# Patient Record
Sex: FEMALE | Employment: OTHER | URBAN - METROPOLITAN AREA
[De-identification: names, ages, dates, MRNs, and addresses within clinical notes are randomized per-mention and may not be internally consistent; named-entity substitution may affect disease eponyms.]

---

## 2021-10-21 ENCOUNTER — HOSPITAL ENCOUNTER (OUTPATIENT)
Dept: PHYSICAL THERAPY | Age: 81
Discharge: HOME | End: 2021-10-21
Payer: MEDICARE

## 2021-10-21 PROCEDURE — 97112 NEUROMUSCULAR REEDUCATION: CPT

## 2021-10-21 PROCEDURE — 97162 PT EVAL MOD COMPLEX 30 MIN: CPT

## 2021-10-21 PROCEDURE — 97110 THERAPEUTIC EXERCISES: CPT

## 2021-10-21 NOTE — PROGRESS NOTES
[address] [place] [address] PHYSICAL THERAPY  [address] [place] [address] Lico Lock, [address] [employer] [address] - Phone: [phone]  Fax: [phone] / 9452 [facility]  Patient Name: Kimmie Hidalgo : 1940   Medical   Diagnosis: Other low back pain [M54.59] Treatment Diagnosis: Cervicalgia with left UE radiculopathy, mechanical LBP, postural dysfunction   Onset Date: Chronic 2020     Referral Source: Mary Ann Cason MD [facility]): 10/21/2021   Prior Hospitalization: See medical history Provider #:  [id number]   Prior Level of Function: Requires supervision assist with amb and standing ADL's due to fear of falling and partial blindness   Comorbidities: Right eye partial blindness-glaucoma, bilateral knee OA   Medications: Verified on Patient Summary List   The Plan of Care and following information is based on the information from the initial evaluation.   ===========================================================================================  Assessment / key information:  Kimmie Hidalgo is a [de-identified] y.o.  female with Dx: Other low back pain [M54.59], signs and symptoms consistent with cervicalgia with left UE radiculopathy, mechanical LBP, postural dysfunction. Pt presents to PT with reports of chronic LBP in the center of the spine achey in nature. The pt reports left LE pain that radiates from the left buttocks into the lateral left lower LE. The pt reports left LE more achey and pulling in nature in comparison to a radicular nature. The pt has significantly decreased vision in the right eye. The pt denies dizziness or any falls. The pt had a cortisone injection in the L/S one month ago which provided no relief. Objective:  The pt demonstrates the following functional deficits: 1) decreased C/S AROM, 2) left UE radiculopathy that centralizes with C/S retractions with left lateral flexion, 3) L/S extension AROM flexibility deficits (5 degrees L/S extension,) 4) severe hip flexor tightness B, 5) significant hip girdle MMT deficits, 6) UE AROM deficits (150 degrees of overhead AROM B,) and 7) postural dysfunction. See below for more details. C/S ROM     Range   Effect  Strength (MMT)          Right        Left    Flex WFL   Upper Trap (C2,3,4) 4 4   Ext 45    Shld IR (C5,6) 4 4   R-lat flex 38   Shld ER (C5) 4 4   L-lat flex 20   Wrist flex (C7) 5 5   R-rot 65   Wrist ext (C6) 5 5   L-rot 48   Deltiod (C5,6) 3 3         Biceps (C5, C6) 4 4         Triceps (C7) 4 4         Thenar Ext (C8) 5 5         Intrinsics (T1) 5 5       (N - normal; R - reduced; MR - markedly reduced)       L/S ROM      Range   Effect  Strength (MMT)          Right        Left    Flex N   Psoas (L2,3) 4- 4-   Ext MR   Quads (L3) 4 4   R-lat flex R   Ant tib(L4) 5 5   L-lat flex R   Hip Ext (S1, S2) 3- 3-   R-rot R   Glut Med(L5) 3- 3-   L-rot R   Hamstrings(S1,S2) 4 4         Hip IR/ER 4/3- 3/3-     The patient's FOTO score of 44 points indicates 56% limitation in functional ability. The patient would benefit from skilled PT to address the below listed impairments.  Thank you for your referral.  ===========================================================================================  Eval Complexity: History: MEDIUM  Complexity : 1-2 comorbidities / personal factors will impact the outcome/ POC Exam:HIGH Complexity : 4+ Standardized tests and measures addressing body structure, function, activity limitation and / or participation in recreation  Presentation: MEDIUM Complexity : Evolving with changing characteristics  Clinical Decision Making:MEDIUM Complexity : FOTO score of 26-74Overall Complexity:MEDIUM    Problem List: pain affecting function, decrease ROM, decrease strength, impaired gait/ balance, decrease ADL/ functional abilitiies, decrease activity tolerance, decrease flexibility/ joint mobility and decrease transfer abilities   Treatment Plan may include any combination of the following: Therapeutic exercise, Therapeutic activities, Neuromuscular re-education, Physical agent/modality, Gait/balance training, Manual therapy, Patient education, Self Care training, Functional mobility training, Home safety training and Stair training    Patient / Family readiness to learn indicated by: asking questions, trying to perform skills and interest  Persons(s) to be included in education: patient (P)  Barriers to Learning/Limitations: None  Measures taken: na   Patient Goal (s): \"I'd like to be able to walk better and walk longer distances. \"   Patient self reported health status: fair  Rehabilitation Potential: good   Short Term Goals: To be accomplished in  2-3  weeks:  1. Patient will demonstrate compliance with HEP for symptom management at home. 2. Patient will demonstrate at least 60 degrees of C/S rotation AROM B to improve safety and efficiency with driving. 3. Patient will demonstrate 165 degrees of shoulder flexion AROM B to decrease C/S strain with overhead ADL's.  Long Term Goals: To be accomplished in  4-6  weeks:  1. Patient will be independent with HEP to self-manage and prevent symptoms upon DC. 2.  Patient will improve FOTO score to at least 55 points to indicate improved functional status. 3.  Patient will demonstrate at least 4/5 hip abduction MMT B to improve pelvic stability with amb. 4.  Patient will perform a 5x sit to  no more than 13 seconds to decrease fall risk with amb.   Frequency / Duration:   Patient to be seen  2-3  times per week for 4-6  weeks:  Patient / Caregiver education and instruction: self care, activity modification and exercises      Therapist Signature: Nancy Sparks DPT Date: 50/98/9490   Certification Period: 102/21/21 - 1/20/21 Time: 1:25 PM   ===========================================================================================  I certify that the above Physical Therapy Services are being furnished while the patient is under my care. I agree with the treatment plan and certify that this therapy is necessary. Physician Signature:        Date:       Time:        Galindo Padilla MD  Please sign and return to [employer] or you may fax the signed copy to [phone]. Thank you.

## 2021-10-21 NOTE — PROGRESS NOTES
PHYSICAL THERAPY - DAILY TREATMENT NOTE    Patient Name: Oneyda Harris        Date: 10/21/2021  : 1940   YES Patient  Verified  Visit #:     Insurance: Payor: Sunil Proffer / Plan: [address] [place] [address] HMO / Product Type: Managed Care Medicare /      In time: 11:50 Out time: 12:50   Total Treatment Time: 60     Medicare/BCBS Time Tracking (below)   Total Timed Codes (min):  40 1:1 Treatment Time:  40     TREATMENT AREA =  L/S    SUBJECTIVE    Pain Level (on 0 to 10 scale):  5  / 10   Medication Changes/New allergies or changes in medical history, any new surgeries or procedures? NO     If yes, update Summary List   Subjective Functional Status/Changes:  []  No changes reported     History of Condition: Pt presents to PT with reports of chronic LBP in the center of the spine achey in nature. The pt reports left LE pain that radiates from the left buttocks into the lateral left lower LE. The pt reports left LE more achey and pulling in nature in comparison to a radicular nature. The pt has significantly decreased vision in the right eye. The pt denies dizziness or any falls. The pt had a cortisone injection in the L/S one month ago which provided no relief. Aggravating Factors: Alleviating Factors:     Previous Treatment:     PMHx:see chart    Social/Recreational/Work:pt sleep on both sides, not in fetal position    Pt Goals: \"I'd like to be able to walk better and walk longer distances. \"    FOTO: 44     CERVICAL EVALUATION    Objective:      Palpation/Sensation:    UE gross AROM: 155 degrees bilateral    (N - normal; R - reduced; MR - markedly reduced)        C/S ROM     Range   Effect  Strength (MMT)          Right        Left    Flex WFL  Upper Trap (C2,3,4) 4 4   Ext 45  Shld IR (C5,6) 4 4   R-lat flex 38  Shld ER (C5) 4 4   L-lat flex 20  Wrist flex (C7) 5 5   R-rot 65  Wrist ext (C6) 5 5   L-rot 48  Deltiod (C5,6) 3 3      Biceps (C5, C6) 4 4      Triceps (C7) 4 4 Thenar Ext (C8) 5 5      Intrinsics (T1) 5 5     Strength              Right     Left        Special Tests            Right     Left   @ setting #3   Cervical Compression       @ setting#3   Cervical Traction      @ setting#3   Spurlings        Alar ligament        Sharp-Mindi       LOW BACK EVALUATION    Objective:      Gait    Posture:    Palpation/Sensation:    (N - normal; R - reduced; MR - markedly reduced)       L/S ROM      Range   Effect  Strength (MMT)          Right        Left    Flex N  Psoas (L2,3) 4- 4-   Ext MR  Quads (L3) 4 4   R-lat flex R  Ant tib(L4) 5 5   L-lat flex R  Hip Ext (S1, S2) 3- 3-   R-rot R  Glut Med(L5) 3- 3-   L-rot R  Hamstrings(S1,S2) 4 4      Hip IR/ER 4/3- 3/3-     Strength (MMT)       Right     Left          Core: Sup Bridge     Core: Side Bridge     Core: Prone plank       Special Tests                       Right     Left          Flexibility         Right              Left    Slump   90/90 HS     SLR   Estephanie Prophet     Sl screen 3/5=70% LR   Lazarus     Gaenslen test   Hip IR (prone)     Lux/ABDULAZIZ sign   Hip ER (prone)     Thigh thrust        Distraction        Lateral Compression        Sacral Provocation          Effect of:  DKC    Supine Bridge    SL Supine Bridge    Prone on Elbows    RFIS    REIL          SI Symmetry:    Standing        Supine            Prone                Dynamic      +/- R/L  ASIS    Fwd Flex    IC    Stork    PSIS    Seated FF      Sup to Sit:     Modalities Rationale:   To improve activity tolerance for strengthening exercises and ADL's.   min [] Estim, type/location:                                      []  att     []  unatt     []  w/US     []  w/ice    []  w/heat    min []  Mechanical Traction: type/lbs                   []  pro   []  sup   []  int   []  cont    []  before manual    []  after manual    min []  Ultrasound, settings/location:      min []  Iontophoresis w/ dexamethasone, location: []  take home patch       []  in clinic    min []  Ice     []  Heat    location/position:     min []  Vasopneumatic Device, press/temp:     min []  Other:    [x] Skin assessment post-treatment (if applicable):   [x]  intact    []  redness- no adverse reaction     []redness - adverse reaction:      25 min Therapeutic Exercise:  [x]  See flow sheet   Rationale:      increase ROM and increase strength to improve the patients ability to perform ADL's with improved hip girdle flexibility and stability. .     15 min Neuromuscular Re-ed: Repeated lumbar extension, repeated C/S retractions with left lateral flexion   Rationale: To centralize radicular symptoms and improve carryover with ADL's.     min Patient Education:  YES  Reviewed HEP   []  Progressed/Changed HEP based on:   HEP issued     Other Objective/Functional Measures:    See above     Post Treatment Pain Level (on 0 to 10) scale:   0  / 10     ASSESSMENT    Assessment/Changes in Function:     Justification for Eval Code Complexity: MODERATE  Patient History (low 0, mod 1-2, high 3-4):glaucoma and partial blindness right eye, bilateral knee OA MODERATE    Examination (low 1-2, mod 3+, high 4+): LE AROM, LE MMT, L/S AROM, UE AROM, UE MMT, C/S AROM, C/S and L/S repeated motions HIGH   Clinical Presentation (low: stable/uncomplicated; mod: evolving; high: unstable/unpredictable): evolving symptoms with exercise MODERATE  Clinical Decision Making (low , mod 26-74, high 1-25): 44 MODERATE     []  See Progress Note/Recertification   Patient will continue to benefit from skilled PT services to modify and progress therapeutic interventions, address functional mobility deficits, address ROM deficits, address strength deficits, analyze and address soft tissue restrictions, analyze and cue movement patterns, analyze and modify body mechanics/ergonomics and assess and modify postural abnormalities to attain remaining goals.    Progress toward goals / Updated goals:    Goals established, see PoC     PLAN    [x]  Upgrade activities as tolerated YES Continue plan of care   []  Discharge due to :    [x]  Other: Initiate PoC     Therapist: Wenceslao Cardona    Date: 10/21/2021 Time: 11:58 AM

## 2021-10-26 ENCOUNTER — HOSPITAL ENCOUNTER (OUTPATIENT)
Dept: PHYSICAL THERAPY | Age: 81
Discharge: HOME | End: 2021-10-26
Payer: MEDICARE

## 2021-10-26 PROCEDURE — 97112 NEUROMUSCULAR REEDUCATION: CPT

## 2021-10-26 PROCEDURE — 97110 THERAPEUTIC EXERCISES: CPT

## 2021-10-26 NOTE — PROGRESS NOTES
PHYSICAL THERAPY - DAILY TREATMENT NOTE    Patient Name: Rigo Chen        Date: 10/26/2021  : 1940   YES Patient  Verified  Visit #:   2     Insurance: Payor: Sun Parnell / Plan: [address] [place] [address] HMO / Product Type: Managed Care Medicare /      In time: 10:12 Out time: 11:05   Total Treatment Time: 53     Medicare/BCBS Time Tracking (below)   Total Timed Codes (min):  53 1:1 Treatment Time:  53     TREATMENT AREA =  Other low back pain [M54.59]    SUBJECTIVE    Pain Level (on 0 to 10 scale):  7  / 10   Medication Changes/New allergies or changes in medical history, any new surgeries or procedures? NO     If yes, update Summary List   Subjective Functional Status/Changes:  []  No changes reported     \"I've been feeling good, but something happened yesterday and my back started really hurting. \"          OBJECTIVE    Modalities Rationale: To improve activity tolerance for exercise performance and ADL's.    min [] Estim, type/location:                                      []  att     []  unatt     []  w/US     []  w/ice    []  w/heat    min []  Mechanical Traction: type/lbs                   []  pro   []  sup   []  int   []  cont    []  before manual    []  after manual    min []  Ultrasound, settings/location:      min []  Iontophoresis w/ dexamethasone, location:                                               []  take home patch       []  in clinic   NA min []  Ice     []  Heat    location/position:     min []  Vasopneumatic Device, press/temp:     min []  Other:    [x] Skin assessment post-treatment (if applicable):   [x]  intact    []  redness- no adverse reaction     []redness - adverse reaction:    40 min Therapeutic Exercise:  [x]  See flow sheet   Rationale:      increase ROM and increase strength to improve the patients ability to perform ADL's with improved core stability and hip girdle and periscapular flexibility.        13 min Neuromuscular Reeducation Repeated C/S retractions, repeated L/S extension in standing. Rationale: To cetnralize radicular symptoms and improve efficiency dressing ADL's.   min Patient Education:  YES  Reviewed HEP   []  Progressed/Changed HEP based on:   Patient reports compliance     Other Objective/Functional Measures:    Pt reports no change in symptoms with standing lumbar extension. Pt reports symptoms abolish upon lying down. Performed SKTC in supine. Once pt stood and walked for 5 minutes, LBP did not return. Pt has no pain with lucille, keeping SKTC component. Pt requires cues with open book to perform with T/S rotation vs shoulder horizontal hyperabduction. See flowsheet for more details. Post Treatment Pain Level (on 0 to 10) scale:   0  / 10     ASSESSMENT    Assessment/Changes in Function:     WIll continue to progress strengthening/AROM per activity tolerance. []  See Progress Note/Recertification   Patient will continue to benefit from skilled PT services to modify and progress therapeutic interventions, address functional mobility deficits, address ROM deficits, address strength deficits, analyze and address soft tissue restrictions, analyze and cue movement patterns, analyze and modify body mechanics/ergonomics and assess and modify postural abnormalities to attain remaining goals. Progress toward goals / Updated goals: · Short Term Goals: To be accomplished in  2-3  weeks:  1. Patient will demonstrate compliance with HEP for symptom management at home. 2. Patient will demonstrate at least 60 degrees of C/S rotation AROM B to improve safety and efficiency with driving. Addressed with C/S retractions with lateral flexion. 3. Patient will demonstrate 165 degrees of shoulder flexion AROM B to decrease C/S strain with overhead ADL's. Addressed with open book. · Long Term Goals: To be accomplished in  4-6  weeks:  1. Patient will be independent with HEP to self-manage and prevent symptoms upon DC.   2.  Patient will improve FOTO score to at least 55 points to indicate improved functional status. 3.  Patient will demonstrate at least 4/5 hip abduction MMT B to improve pelvic stability with amb. Addressed with clams. 4.  Patient will perform a 5x sit to  no more than 13 seconds to decrease fall risk with amb.      PLAN    [x]  Upgrade activities as tolerated YES Continue plan of care   []  Discharge due to :    []  Other:      Therapist: Gisela Willis    Date: 10/26/2021 Time: 10:26 AM     Future Appointments   Date Time Provider Ying Koch   11/2/2021 11:45 AM Priya Constable [facility]   11/3/2021 11:00 AM Priya Constable [facility]   11/9/2021  1:15 PM Kirk Bonnerpper [facility]   11/11/2021 11:00 AM Kailey Rajput PT [facility]   11/16/2021 11:00 AM Kirk Bonnerpper [facility]   11/18/2021 11:00 AM Kailey Rajput PT [facility]   11/22/2021 11:00 AM Kirk Bonnerpper [facility]   11/24/2021  1:15 PM Kailey Rajput PT [facility]   11/30/2021 11:00 AM Priya Constable [facility]

## 2021-10-28 ENCOUNTER — APPOINTMENT (OUTPATIENT)
Dept: PHYSICAL THERAPY | Age: 81
End: 2021-10-28
Payer: MEDICARE

## 2021-11-01 ENCOUNTER — APPOINTMENT (OUTPATIENT)
Dept: PHYSICAL THERAPY | Age: 81
End: 2021-11-01
Payer: MEDICARE

## 2021-11-02 ENCOUNTER — APPOINTMENT (OUTPATIENT)
Dept: PHYSICAL THERAPY | Age: 81
End: 2021-11-02
Payer: MEDICARE

## 2021-11-03 ENCOUNTER — HOSPITAL ENCOUNTER (OUTPATIENT)
Dept: PHYSICAL THERAPY | Age: 81
Discharge: HOME | End: 2021-11-03
Payer: MEDICARE

## 2021-11-03 PROCEDURE — 97110 THERAPEUTIC EXERCISES: CPT

## 2021-11-03 NOTE — PROGRESS NOTES
PHYSICAL THERAPY - DAILY TREATMENT NOTE11:55    Patient Name: Janee Hargrove        Date: 11/3/2021  : 1940   YES Patient  Verified  Visit #:   3     Insurance: Payor: Jacqueline Marvin / Plan: [address] [place] [address] HMO / Product Type: Managed Care Medicare /      In time: 1100 Out time: 0196   Total Treatment Time: 53     Medicare/BCBS [place] Time Tracking (below)   Total Timed Codes (min):  43 1:1 Treatment Time:  43     TREATMENT AREA =  Other low back pain [M54.59]    SUBJECTIVE    Pain Level (on 0 to 10 scale):  4  / 10   Medication Changes/New allergies or changes in medical history, any new surgeries or procedures? NO    If yes, update Summary List   Subjective Functional Status/Changes:  []  No changes reported     Patient went out of town this past weekend to visit grandchildren. Her family has a 70lb dog who jumped on the couch while she was sitting on it. She describes quickly reaching for her granddaughter which irritated LBP. OBJECTIVE    43 min Therapeutic Exercise:  [x]  See flow sheet   Rationale:      increase ROM and increase strength to improve the patients ability to perform ADL's with greater ease. Modalities Rationale:    decrease pain and increase tissue extensibility to improve patient's ability to complete functional tasks with less pain. 10 min []  Ice     [x]  Heat    location/position: Lower back, Post tx supine with wedge   [x] Skin assessment post-treatment (if applicable):    [x]  intact    [x]  redness- no adverse reaction     []redness - adverse reaction:       min Patient Education:  YES  Reviewed HEP   []  Progressed/Changed HEP based on:   Cont with HEP     Other Objective/Functional Measures:    Cont with outlined TE program. Due to ms soreness, only progressed with one new exercise. VC required for mechanics.       Post Treatment Pain Level (on 0 to 10) scale:   0  / 10     ASSESSMENT    Assessment/Changes in Function:     Session tolerated well without reproduction of symptoms. []  See Progress Note/Recertification   Patient will continue to benefit from skilled PT services to analyze, cue, progress, modify,, demonstrate, instruct, and address, movement patterns, therapeutic interventions, postural abnormalities, soft tissue restrictions, ROM, strength, functional mobility, body mechanics/ergonomics, and home and community integration, to attain remaining goals. Progress toward goals / Updated goals: · Short Term Goals: To be accomplished in  2-3  weeks:  1. Patient will demonstrate compliance with HEP for symptom management at home. ongoing  2. Patient will demonstrate at least 60 degrees of C/S rotation AROM B to improve safety and efficiency with driving. Open books to address   3. Patient will demonstrate 165 degrees of shoulder flexion AROM B to decrease C/S strain with overhead ADL's.        PLAN    []  Upgrade activities as tolerated YES Continue plan of care   []  Discharge due to :    []  Other:      Therapist: Noa Schumacher PT, DPT    Date: 11/3/2021 Time: 10:58 AM     Future Appointments   Date Time Provider Ying Koch   11/3/2021 11:00 AM Jm Dawkins PT [facility]   11/9/2021  1:15 PM Monique Commons [facility]   11/11/2021 11:00 AM Jm Dawkins PT [facility]   11/16/2021 11:00 AM Monique Commons [facility]   11/18/2021 11:00 AM Jm Dawkins PT [facility]   11/22/2021 11:00 AM Monique Commons [facility]   11/24/2021  1:15 PM Jm Dawkins PT [facility]   11/30/2021 11:00 AM Africa Leon

## 2021-11-09 ENCOUNTER — HOSPITAL ENCOUNTER (OUTPATIENT)
Dept: PHYSICAL THERAPY | Age: 81
Discharge: HOME | End: 2021-11-09
Payer: MEDICARE

## 2021-11-09 PROCEDURE — 97110 THERAPEUTIC EXERCISES: CPT

## 2021-11-09 NOTE — PROGRESS NOTES
PHYSICAL THERAPY - DAILY TREATMENT NOTE    Patient Name: Rigo Chen        Date: 2021  : 1940   YES Patient  Verified  Visit #:   4     Insurance: Payor: Juanaravind Parmjit / Plan: [address] [place] [address] HMO / Product Type: Managed Care Medicare /      In time: 1:18 Out time: 2:15   Total Treatment Time: 57     Medicare/BCBS [place] Time Tracking (below)   Total Timed Codes (min):  47 1:1 Treatment Time:  47     TREATMENT AREA =  Other low back pain [M54.59]    SUBJECTIVE    Pain Level (on 0 to 10 scale):  4  / 10   Medication Changes/New allergies or changes in medical history, any new surgeries or procedures? NO    If yes, update Summary List   Subjective Functional Status/Changes:  []  No changes reported     Pt reports she is doing the hip stretch (modified lucille stretch) at home and has worked up to 5 minutes          OBJECTIVE    Modalities Rationale: decrease pain and increase tissue extensibility to improve patient's ability to complete functional tasks with less pain.     min [] Estim, type/location:                                      []  att     []  unatt     []  w/US     []  w/ice    []  w/heat    min []  Mechanical Traction: type/lbs                   []  pro   []  sup   []  int   []  cont    []  before manual    []  after manual    min []  Ultrasound, settings/location:      min []  Iontophoresis w/ dexamethasone, location:                                               []  take home patch       []  in clinic   10 min []  Ice     [x]  Heat    location/position: MHP to L/S, Patient supine with LE wedge    min []  Vasopneumatic Device, press/temp:     min []  Other:    [x] Skin assessment post-treatment (if applicable):    [x]  intact    [x]  redness- no adverse reaction     []redness - adverse reaction:      47 min Therapeutic Exercise:  [x]  See flow sheet   Rationale:    increase ROM, increase strength, improve coordination, improve balance and increase proprioception to promote increased functional mobility and increased activity tolerance with ADL's.     min Patient Education:  YES  Reviewed HEP   []  Progressed/Changed HEP based on:   Patient with no questions, continue same HEP     Other Objective/Functional Measures:    Pt c/o knee pain with sit<>stand. Added LAQ's for strengthening. Mod VCing for form with therapeutic exercise. Post Treatment Pain Level (on 0 to 10) scale:   0  / 10     ASSESSMENT    Assessment/Changes in Function:     Pt reports improved tolerance to modified Sukh stretch. []  See Progress Note/Recertification   Patient will continue to benefit from skilled PT services to modify and progress therapeutic interventions, address functional mobility deficits, address ROM deficits, address strength deficits, analyze and address soft tissue restrictions, analyze and cue movement patterns, assess and modify postural abnormalities, and instruct in home and community integration to attain remaining goals. Progress toward goals / Updated goals:    1. Patient will demonstrate compliance with HEP for symptom management at home. pt reports compliance with HEP  2. Patient will demonstrate at least 60 degrees of C/S rotation AROM B to improve safety and efficiency with driving. 3. Patient will demonstrate 165 degrees of shoulder flexion AROM B to decrease C/S strain with overhead ADL's.      PLAN    []  Upgrade activities as tolerated YES Continue plan of care   []  Discharge due to :    []  Other:      Therapist: James Lamb PTA    Date: 11/9/2021 Time: 1:23 PM     Future Appointments   Date Time Provider Ying Koch   11/10/2021 11:00 AM Jonathan Cordova [facility]   11/16/2021 11:00 AM Williams [facility]   11/18/2021 11:00 AM Rd Felix PT [facility]   11/22/2021 11:00 AM Williams [facility]   11/24/2021  1:15 PM Rd Felix PT [facility]   11/30/2021 11:00 AM Sera Lechuga [facility]

## 2021-11-10 ENCOUNTER — HOSPITAL ENCOUNTER (OUTPATIENT)
Dept: PHYSICAL THERAPY | Age: 81
Discharge: HOME | End: 2021-11-10
Payer: MEDICARE

## 2021-11-10 PROCEDURE — 97110 THERAPEUTIC EXERCISES: CPT

## 2021-11-10 PROCEDURE — 97530 THERAPEUTIC ACTIVITIES: CPT

## 2021-11-10 NOTE — PROGRESS NOTES
PHYSICAL THERAPY - DAILY TREATMENT NOTE    Patient Name: Vin Harkins        Date: 11/10/2021  : 1940   yes Patient  Verified  Visit #:     Insurance: Payor: Mardegisela Roamna / Plan: [address] [place] [address] HMO / Product Type: Managed Care Medicare /      In time: 780 Out time: 72   Total Treatment Time: 63     Medicare/BCBS Time Tracking (below)   Total Timed Codes (min):  53 1:1 Treatment Time:  53     TREATMENT AREA =  Other low back pain [M54.59]    SUBJECTIVE  Pain Level (on 0 to 10 scale):  5-6  / 10   Medication Changes/New allergies or changes in medical history, any new surgeries or procedures?    no  If yes, update Summary List   Subjective Functional Status/Changes:  []  No changes reported     \"I am feeling it in the back, not sure what it is from.  I am willing to try and see what works\"          OBJECTIVE  Modalities Rationale:     decrease pain to improve patient's ability to safely perform ADLs, bending/stooping/ lifting; perform prolong sitting/standing/ambulation; and negotiate stairs with no pain or limitations     10 min []  Ice     [x]  Heat    location/position: Supine with wedge under B LEs  extra layer (towel)    [x]Skin assessment post-treatment (if applicable):   [x]  intact    []  redness- no adverse reaction                  []redness - adverse reaction:      43 min Therapeutic Exercise:  [x]  See flow sheet   Rationale:      increase ROM, increase strength and improve coordination to improve the patients ability to safely perform ADLs, bending/stooping/ lifting; perform prolong sitting/standing/ambulation; and negotiate stairs with no pain or limitations      10 min Therapeutic Activity: [x]  See flow sheet   Rationale:    increase ROM, increase strength and improve coordination to improve the patients ability to safely perform ADLs, bending/stooping/ lifting; perform prolong sitting/standing/ambulation; and negotiate stairs with no pain or limitations Billed With/As:   [x] TE   [x] TA   [] Neuro   [] Self Care Patient Education: [x] Review HEP    [] Progressed/Changed HEP based on:   [x] positioning   [x] body mechanics   [x] transfers   [] heat/ice application    [x] other: Pt ed on importance and benefits of compliance with HEP, core strength/stability and proper posture; pt verbalized understanding       Other Objective/Functional Measures:  VCs + demo to perform proper technique for TE  had pt reest frequently due to fatigues quickly   sit <>std without UE from 25'' with no increase in pain    initiated Wall taps, and postural training without producing pain     Post Treatment Pain Level (on 0 to 10) scale:   0  / 10     ASSESSMENT  Assessment/Changes in Function:   increase time to carry out all TE   Progressed TE without c/o increase p! 31 Alogisela NewellAna Cristina ERROLMARLENE Sh flex WNL   []  See Progress Note/Recertification   Patient will continue to benefit from skilled PT services to modify and progress therapeutic interventions, address functional mobility deficits, address ROM deficits, address strength deficits, analyze and address soft tissue restrictions, analyze and cue movement patterns, analyze and modify body mechanics/ergonomics, assess and modify postural abnormalities and instruct in home and community integration to attain remaining goals. Progress toward goals / Updated goals: · Short Term Goals: To be accomplished in  2-3  weeks:  1. Patient will demonstrate compliance with HEP for symptom management at home. Established HEP  2. Patient will demonstrate at least 60 degrees of C/S rotation AROM B to improve safety and efficiency with driving. 3. Patient will demonstrate 165 degrees of shoulder flexion AROM B to decrease C/S strain with overhead ADL's.initiated wall taps   · Long Term Goals: To be accomplished in  4-6  weeks:  1. Patient will be independent with HEP to self-manage and prevent symptoms upon DC.   2.  Patient will improve FOTO score to at least 55 points to indicate improved functional status. 3.  Patient will demonstrate at least 4/5 hip abduction MMT B to improve pelvic stability with amb.   4.  Patient will perform a 5x sit to  no more than 13 seconds to decrease fall risk with amb.performing 5 x 2 sit <>std without UE at 22''     PLAN  [x]  Upgrade activities as tolerated yes Continue plan of care   []  Discharge due to :    []  Other:      Therapist: Estelita Turner PTA    Date: 11/10/2021 Time: 1:06 PM     Future Appointments   Date Time Provider Ying Koch   11/16/2021 11:00 AM Clifton Linden [facility]   11/18/2021 11:00 AM Jerilyn Spurling, PT [facility]   11/22/2021 11:00 AM Clifton Linden [facility]   11/24/2021  1:15 PM Jerilyn Spurling, PT [facility]   11/30/2021 11:00 AM Mariaelena Resendez [facility]

## 2021-11-11 ENCOUNTER — APPOINTMENT (OUTPATIENT)
Dept: PHYSICAL THERAPY | Age: 81
End: 2021-11-11
Payer: MEDICARE

## 2021-11-16 ENCOUNTER — HOSPITAL ENCOUNTER (OUTPATIENT)
Dept: PHYSICAL THERAPY | Age: 81
Discharge: HOME | End: 2021-11-16
Payer: MEDICARE

## 2021-11-16 PROCEDURE — 97110 THERAPEUTIC EXERCISES: CPT

## 2021-11-16 NOTE — PROGRESS NOTES
PHYSICAL THERAPY - DAILY TREATMENT NOTE    Patient Name: Carmenza Meyer        Date: 2021  : 1940   YES Patient  Verified  Visit #:     Insurance: Payor: Valeria Salazar / Plan: [address] [place] [address] HMO / Product Type: Managed Care Medicare /      In time: 11:01 Out time: 11:54   Total Treatment Time: 53     Medicare/BCBS [place] Time Tracking (below)   Total Timed Codes (min):  43 1:1 Treatment Time:  43     TREATMENT AREA =  Other low back pain [M54.59]    SUBJECTIVE    Pain Level (on 0 to 10 scale):  5  / 10   Medication Changes/New allergies or changes in medical history, any new surgeries or procedures? NO    If yes, update Summary List   Subjective Functional Status/Changes:  []  No changes reported     \"My back was hurting this morning. \"      Pt reports LBP with standing or walking for longer times. OBJECTIVE    Modalities Rationale: decrease pain and increase tissue extensibility to improve patient's ability to complete functional tasks with less pain.    min [] Estim, type/location:                                      []  att     []  unatt     []  w/US     []  w/ice    []  w/heat    min []  Mechanical Traction: type/lbs                   []  pro   []  sup   []  int   []  cont    []  before manual    []  after manual    min []  Ultrasound, settings/location:      min []  Iontophoresis w/ dexamethasone, location:                                               []  take home patch       []  in clinic   10 min []  Ice     [x]  Heat    location/position: MHP to L/S, Patient supine with LE wedge (extra layer of pillow case)     min []  Vasopneumatic Device, press/temp:     min []  Other:    [x] Skin assessment post-treatment (if applicable):    [x]  intact    [x]  redness- no adverse reaction     []redness - adverse reaction:      43 min Therapeutic Exercise:  [x]  See flow sheet   Rationale:    increase ROM, increase strength, improve coordination, improve balance and increase proprioception to promote increased functional mobility and increased activity tolerance with ADL's.      min Patient Education:  YES  Reviewed HEP   []  Progressed/Changed HEP based on:   Patient with no questions, continue same HEP     Other Objective/Functional Measures: Added bridge, tband row/ext and HR for strengthening. Pt reports ms pulling left L/S with wall wash ex. Pt c/o B knee pain with sit<>stand. Post Treatment Pain Level (on 0 to 10) scale:   4  / 10     ASSESSMENT    Assessment/Changes in Function:     Pt reports decreased standing and walking tolerance secondary to LBP. []  See Progress Note/Recertification   Patient will continue to benefit from skilled PT services to modify and progress therapeutic interventions, address functional mobility deficits, address ROM deficits, address strength deficits, analyze and address soft tissue restrictions, analyze and cue movement patterns, assess and modify postural abnormalities, and instruct in home and community integration to attain remaining goals. Progress toward goals / Updated goals:    1. Patient will demonstrate compliance with HEP for symptom management at home. 2. Patient will demonstrate at least 60 degrees of C/S rotation AROM B to improve safety and efficiency with driving. AROM C/S rotation and SBing for ROM   3.  Patient will demonstrate 165 degrees of shoulder flexion AROM B to decrease C/S strain with overhead ADL's. wall wash      PLAN    []  Upgrade activities as tolerated YES Continue plan of care   []  Discharge due to :    []  Other:      Therapist: Chu Duncan PTA    Date: 11/16/2021 Time: 11:05 AM     Future Appointments   Date Time Provider Ying Koch   11/18/2021 11:00 AM Siobhan Pratt PT [facility]   11/22/2021 11:00 AM Albertina Tuttle [facility]   11/24/2021  1:15 PM Siobhan Pratt PT [facility]   11/30/2021 11:00 AM Genesis Moreland

## 2021-11-22 ENCOUNTER — HOSPITAL ENCOUNTER (OUTPATIENT)
Dept: PHYSICAL THERAPY | Age: 81
Discharge: HOME | End: 2021-11-22
Payer: MEDICARE

## 2021-11-22 PROCEDURE — 97110 THERAPEUTIC EXERCISES: CPT

## 2021-11-22 NOTE — PROGRESS NOTES
[facility] PHYSICAL THERAPY  [facility] [address], [address] - Phone: [phone]  Fax: [phone] [facility]          Patient Name: [place] Corral : 1940   Treatment/Medical Diagnosis: Other low back pain [M54.59]   Onset Date: Chronic 2020    Referral Source: Fabian Whiteside MD [place] [facility]): 10/21/21   Prior Hospitalization: See Medical History Provider #: [id number]   Prior Level of Function: Requires supervision assist with amb and standing ADL's due to fear of falling and partial blindness   Comorbidities: Right eye partial blindness-glaucoma, bilateral knee OA   Medications: Verified on Patient Summary List   Vis[facility] from [facility]: 7 Missed Visits: 0     Goal/Measure of Progress Goal Met?   1.  1. Patient will demonstrate compliance with HEP for symptom management at home. Status at last Eval: HEP issued  Current Status: Pt compliant with HEP yes   2.  2. Patient will demonstrate at least 60 degrees of C/S rotation AROM B to improve safety and efficiency with driving. Status at last Eval: right 65 deg   left 48 deg  Current Status: C/S AROM rotation 52 deg left and 64 deg right  Progressing   3.  3. Patient will demonstrate 165 degrees of shoulder flexion AROM B to decrease C/S strain with overhead ADL's. Status at last Eval: 155 deg B  Current Status: flexion 137 deg left and 142 degrees right  no     Key Functional Changes/Progress:  Pt continues with c/o intermittent LBP with ADL's including standing with lumbar flexion for activities like brushing her teeth. Pt c/o ms soreness lateral neck muscles with spinal ROM exercises. Pt c/o B knee pain with sit<>stand transfers, reports recent B knee injection. Pt demo's decreased stride length and lacks reciprocal arm swing and pelvic rotation during gait. Pt is compliant with home exercise program and has good tolerance to therapeutic exercise.      Problem List: pain affecting function, decrease ROM, decrease strength, impaired gait/ balance, decrease ADL/ functional abilitiies, decrease activity tolerance, decrease flexibility/ joint mobility and decrease transfer abilities              Treatment Plan may include any combination of the following: Therapeutic exercise, Therapeutic activities, Neuromuscular re-education, Physical agent/modality, Gait/balance training, Manual therapy, Patient education, Self Care training, Functional mobility training, Home safety training and Stair training       Goals for this certification period include and are to be achieved in   2-4  weeks:  1. Patient will be independent with HEP to self-manage and prevent symptoms upon DC. 2.  Patient will improve FOTO score to at least 55 points to indicate improved functional status. 3.  Patient will demonstrate at least 4/5 hip abduction MMT B to improve pelvic stability with amb. 4.  Patient will perform a 5x sit to  no more than 13 seconds to decrease fall risk with amb. Frequency / Duration:   Patient to be seen   2   times per week for   2-4    weeks:    Assessments/Recommendations: Pt would benefit from continued therapy for core and LE strengthening to facilitate improved activity tolerance like standing with ADL's. If you have any questions/comments please contact us directly at [phone]. Thank you for allowing us to assist in the care of your patient. PTA Signature  Therapist Signature: OLGA King DPT, CLT Date: 96/74/4453   Certification Period:  Reporting Period: 10/21/21 - 1/20/22  10/21/21 - 11/22/21 Time: 10:11 AM   NOTE TO PHYSICIAN:  PLEASE COMPLETE THE ORDERS BELOW AND FAX TO   [facility] Physical Therapy: [phone].   If you are unable to process this request in 24 hours please contact our office: [phone].    ___ I have read the above report and request that my patient continue as recommended.   ___ I have read the above report and request that my patient continue therapy with the following changes/special instructions: ________________________________________________   ___ I have read the above report and request that my patient be discharged from therapy.      Physician Signature:        Date:       Time:                                   Amelia Esposito MD

## 2021-11-22 NOTE — PROGRESS NOTES
PHYSICAL THERAPY - DAILY TREATMENT NOTE    Patient Name: Bonnie Whitten        Date: 2021  : 1940   YES Patient  Verified  Visit #:     Insurance: Payor: Junito Marquis / Plan: [address] [place] [address] HMO / Product Type: Managed Care Medicare /      In time: 11:03 Out time: 12:00   Total Treatment Time: 57     Medicare/BCBS [place] Time Tracking (below)   Total Timed Codes (min):  47 1:1 Treatment Time:  47     TREATMENT AREA =  Other low back pain [M54.59]    SUBJECTIVE    Pain Level (on 0 to 10 scale):  4  / 10   Medication Changes/New allergies or changes in medical history, any new surgeries or procedures? NO    If yes, update Summary List   Subjective Functional Status/Changes:  []  No changes reported     Pt reports she hasn't been getting increased back pain or muscle soreness with the exercises. Pt states her lower back hurts her whenever she bends over, like with brushing her teeth. Pt reports her doctor gave her injections in her knees, she still has to get two more. OBJECTIVE    Modalities Rationale: decrease pain and increase tissue extensibility to improve patient's ability to complete functional tasks with less pain.    min [] Estim, type/location:                                      []  att     []  unatt     []  w/US     []  w/ice    []  w/heat    min []  Mechanical Traction: type/lbs                   []  pro   []  sup   []  int   []  cont    []  before manual    []  after manual    min []  Ultrasound, settings/location:      min []  Iontophoresis w/ dexamethasone, location:                                               []  take home patch       []  in clinic   10 min []  Ice     [x]  Heat    location/position: [facility] with extra layer Patient supine with LE wedge    min []  Vasopneumatic Device, press/temp:     min []  Other:    [x] Skin assessment post-treatment (if applicable):    [x]  intact    [x]  redness- no adverse reaction     []redness - adverse reaction:      47 min Therapeutic Exercise:  [x]  See flow sheet   Rationale:    increase ROM, increase strength, improve coordination, improve balance and increase proprioception to promote increased functional mobility and increased activity tolerance with ADL's.     min Patient Education:  YES  Reviewed HEP   []  Progressed/Changed HEP based on:   Pt reports compliance with HEP. Pt reports difficulty performing bridge on bed. Other Objective/Functional Measures:    C/S AROM rotation 52 deg left and 64 deg right   Shoulder AROM flexion 137 deg left and 142 degrees right     Pt c/o ms tightness lateral neck ms with open book ex. Post Treatment Pain Level (on 0 to 10) scale:   0  / 10     ASSESSMENT    Assessment/Changes in Function:     Pt continues with limited C/S and shoulder AROM. [x]  See Progress Note/Recertification   Patient will continue to benefit from skilled PT services to modify and progress therapeutic interventions, address functional mobility deficits, address ROM deficits, address strength deficits, analyze and address soft tissue restrictions, analyze and cue movement patterns, assess and modify postural abnormalities, and instruct in home and community integration to attain remaining goals. Progress toward goals / Updated goals:    1. Patient will demonstrate compliance with HEP for symptom management at home. MET  2. Patient will demonstrate at least 60 degrees of C/S rotation AROM B to improve safety and efficiency with driving. NOT MET   3. Patient will demonstrate 165 degrees of shoulder flexion AROM B to decrease C/S strain with overhead ADL's.  NOT MET      PLAN    []  Upgrade activities as tolerated YES Continue plan of care   []  Discharge due to :    []  Other:      Therapist: Kelly Barrett PTA    Date: 11/22/2021 Time: 11:12 AM     Future Appointments   Date Time Provider Ying Koch   11/24/2021  1:15 PM Josue Plasencia PT [facility]   11/30/2021 11:00 AM Leland Wild [facility]

## 2021-11-24 ENCOUNTER — APPOINTMENT (OUTPATIENT)
Dept: PHYSICAL THERAPY | Age: 81
End: 2021-11-24
Payer: MEDICARE

## 2021-11-30 ENCOUNTER — HOSPITAL ENCOUNTER (OUTPATIENT)
Dept: PHYSICAL THERAPY | Age: 81
Discharge: HOME | End: 2021-11-30
Payer: MEDICARE

## 2021-11-30 PROCEDURE — 97530 THERAPEUTIC ACTIVITIES: CPT

## 2021-11-30 PROCEDURE — 97110 THERAPEUTIC EXERCISES: CPT

## 2021-11-30 NOTE — PROGRESS NOTES
PHYSICAL THERAPY - DAILY TREATMENT NOTE    Patient Name: Tutu Mendoza        Date: 2021  : 1940   YES Patient  Verified  Visit #:     Insurance: Payor: Yaakov Mcmullennick / Plan: [address] [place] [address] HMO / Product Type: Managed Care Medicare /      In time: 11:00 Out time: 11:45   Total Treatment Time: 45     Medicare/BCBS Time Tracking (below)   Total Timed Codes (min):  45 1:1 Treatment Time:  45     TREATMENT AREA =  Other low back pain [M54.59]    SUBJECTIVE    Pain Level (on 0 to 10 scale):  4  / 10   Medication Changes/New allergies or changes in medical history, any new surgeries or procedures? NO     If yes, update Summary List   Subjective Functional Status/Changes:  []  No changes reported     \"I feel alright. I guess that's a good thing since I saw a lot of grandkids and I didn't do exercises and I don't feel worse. I feel better after I finish here, but the pain always comes back. I went to see Dr. Luis Alfredo Queen and he's going to give me a series or cortisone injections in my knees to tolerate more activity. I had one last week and I'll have one every week for the next two weeks. \"          OBJECTIVE    Modalities Rationale:  To improve activity tolerance for exercise performance and ADL's.    min [] Estim, type/location:                                      []  att     []  unatt     []  w/US     []  w/ice    []  w/heat    min []  Mechanical Traction: type/lbs                   []  pro   []  sup   []  int   []  cont    []  before manual    []  after manual    min []  Ultrasound, settings/location:      min []  Iontophoresis w/ dexamethasone, location:                                               []  take home patch       []  in clinic   NA min []  Ice     []  Heat    location/position:     min []  Vasopneumatic Device, press/temp:     min []  Other:    [x] Skin assessment post-treatment (if applicable):   [x]  intact    []  redness- no adverse reaction     []redness - adverse reaction:    15 min Therapeutic Exercise:  [x]  See flow sheet   Rationale:      increase ROM and increase strength to improve the patients ability to improved hip girdle and quadriceps stability. 30 min Therapeutic Activity: Sit to stands, bridges, marches, relative SLS with standing hip abduction, FOTO   Rationale: To increase safety and efficiency with ADL's.   min Patient Education:  YES  Reviewed HEP   []  Progressed/Changed HEP based on:   Patient reports compliance     Other Objective/Functional Measures: The pt demonstrates hip IR and genu valgus with sit to stands. Mat height was elecated to 24\" since the pt could not perform from 22\" without compensation. Addressed hip IR compensatory pattern with use of an orange band across her knees and a piriformis stretch in sitting. Pt requires tactile cues at her feet (therapist foot) to keep from compensating; however, pt reports no pain with performing without compensation. FOTO indicates increased difficulty with standing ADL's. Pt was transitioned to doing more standing exercises with realtive PPT and TA draw to decrease LBP. Pt reports no increased pain with standing exercises, but does report prayer stretch over the large swiss ball did decrease symptoms. See flowsheet for more details. Post Treatment Pain Level (on 0 to 10) scale:   0  / 10     ASSESSMENT    Assessment/Changes in Function:     WIll continue to progress strengthening/AROM per activity tolerance. []  See Progress Note/Recertification   Patient will continue to benefit from skilled PT services to modify and progress therapeutic interventions, address functional mobility deficits, address ROM deficits, address strength deficits, analyze and address soft tissue restrictions, analyze and cue movement patterns, analyze and modify body mechanics/ergonomics and assess and modify postural abnormalities to attain remaining goals.    Progress toward goals / Updated goals: Addressed FOTO with standing exercises. Addrssed HEP independence with updated HEP.      PLAN    [x]  Upgrade activities as tolerated YES Continue plan of care   []  Discharge due to :    []  Other:      Therapist: Dale Ordoñez    Date: 11/30/2021 Time: 1:14 PM     Future Appointments   Date Time Provider Ying Koch   12/3/2021 11:00 AM Eve Mooring [facility]   12/6/2021 11:00 AM Eve Mooring [facility]   12/9/2021 11:00 AM Omelia Scarpa [facility]   12/13/2021 11:00 AM Eveelia Mooring [facility]   12/16/2021 11:45 AM Omelia Scarpa [facility]   12/20/2021 11:00 AM Eveelia Mooring [facility]

## 2021-12-03 ENCOUNTER — HOSPITAL ENCOUNTER (OUTPATIENT)
Dept: PHYSICAL THERAPY | Age: 81
Discharge: HOME | End: 2021-12-03
Payer: MEDICARE

## 2021-12-03 PROCEDURE — 97110 THERAPEUTIC EXERCISES: CPT

## 2021-12-03 PROCEDURE — 97530 THERAPEUTIC ACTIVITIES: CPT

## 2021-12-03 PROCEDURE — 97112 NEUROMUSCULAR REEDUCATION: CPT

## 2021-12-03 NOTE — PROGRESS NOTES
PHYSICAL THERAPY - DAILY TREATMENT NOTE    Patient Name: Jorge Mccord        Date: 12/3/2021  : 1940   YES Patient  Verified  Visit #:     Insurance: Payor: Lois Soni / Plan: [address] [place] [address] HMO / Product Type: Managed Care Medicare /      In time: 11:00 Out time: 11:53   Total Treatment Time: 53     Medicare/BCBS Time Tracking (below)   Total Timed Codes (min):  53 1:1 Treatment Time:  53     TREATMENT AREA =  Other low back pain [M54.59]    SUBJECTIVE    Pain Level (on 0 to 10 scale):  4  / 10   Medication Changes/New allergies or changes in medical history, any new surgeries or procedures? NO     If yes, update Summary List   Subjective Functional Status/Changes:  []  No changes reported     \"I had a lot of muscle soreness and cramping the day after I saw you. That went away, but I moved funny in the kitchen this morning and my back started really hurting. I took some medicine. \"          OBJECTIVE    Modalities Rationale: To improve activity tolerance for exercise performance and ADL's.    min [] Estim, type/location:                                      []  att     []  unatt     []  w/US     []  w/ice    []  w/heat    min []  Mechanical Traction: type/lbs                   []  pro   []  sup   []  int   []  cont    []  before manual    []  after manual    min []  Ultrasound, settings/location:      min []  Iontophoresis w/ dexamethasone, location:                                               []  take home patch       []  in clinic   NA min []  Ice     []  Heat    location/position:     min []  Vasopneumatic Device, press/temp:     min []  Other:    [x] Skin assessment post-treatment (if applicable):   [x]  intact    []  redness- no adverse reaction     []redness - adverse reaction:    15 min Therapeutic Exercise:  [x]  See flow sheet   Rationale:      increase ROM and increase strength to improve the patients ability to improved hip girdle and quadriceps stability. 25 min Therapeutic Activity: Sit to stands, coreen mock   Rationale: To increase safety and efficiency with ADL's.    13 min Neuromuscular Reeducation: relative SLS with standing hip abduction/extension, MSR EO, rhomberg EO, shoulder-width stance EC. To improve safety and efficeincy with reaching ADL's and amb.     min Patient Education:  YES  Reviewed HEP   []  Progressed/Changed HEP based on:   Patient reports compliance     Other Objective/Functional Measures:    Pt was able to perform sit to stands without pain or compensation at 22\". Initiated standing hip extension with TA draw. Pt requires cues to perform with hip extension and not just knee flexion. Intiiated balance exercises. Pt is requires tactile assistance to prevent LoB performing MSR EO. See flowsheet for more details. Post Treatment Pain Level (on 0 to 10) scale:   0  / 10     ASSESSMENT    Assessment/Changes in Function:     WIll continue to progress strengthening/AROM per activity tolerance. []  See Progress Note/Recertification   Patient will continue to benefit from skilled PT services to modify and progress therapeutic interventions, address functional mobility deficits, address ROM deficits, address strength deficits, analyze and address soft tissue restrictions, analyze and cue movement patterns, analyze and modify body mechanics/ergonomics and assess and modify postural abnormalities to attain remaining goals. Progress toward goals / Updated goals: Addressed FOTO with standing exercises.      PLAN    [x]  Upgrade activities as tolerated YES Continue plan of care   []  Discharge due to :    []  Other:      Therapist: Kari Stevens    Date: 12/3/2021 Time: 1:14 PM     Future Appointments   Date Time Provider Ying Koch   12/6/2021 11:00 [facility]   12/9/2021 11:00 AM Federica Hilton [facility]   12/13/2021 11:00 AM Fredie Peter [facility]   12/16/2021 11:45 AM Wilder Iva [facility]   12/20/2021 11:00 AM Sury Malagon [facility]

## 2021-12-06 ENCOUNTER — APPOINTMENT (OUTPATIENT)
Dept: PHYSICAL THERAPY | Age: 81
End: 2021-12-06
Payer: MEDICARE

## 2021-12-09 ENCOUNTER — APPOINTMENT (OUTPATIENT)
Dept: PHYSICAL THERAPY | Age: 81
End: 2021-12-09
Payer: MEDICARE

## 2021-12-13 ENCOUNTER — APPOINTMENT (OUTPATIENT)
Dept: PHYSICAL THERAPY | Age: 81
End: 2021-12-13
Payer: MEDICARE

## 2021-12-16 ENCOUNTER — HOSPITAL ENCOUNTER (OUTPATIENT)
Dept: PHYSICAL THERAPY | Age: 81
Discharge: HOME | End: 2021-12-16
Payer: MEDICARE

## 2021-12-16 PROCEDURE — 97112 NEUROMUSCULAR REEDUCATION: CPT

## 2021-12-16 PROCEDURE — 97110 THERAPEUTIC EXERCISES: CPT

## 2021-12-16 PROCEDURE — 97116 GAIT TRAINING THERAPY: CPT

## 2021-12-16 NOTE — PROGRESS NOTES
PHYSICAL THERAPY - DAILY TREATMENT NOTE    Patient Name: Nargis Giron        Date: 2021  : 1940   YES Patient  Verified  Visit #:   10   of   12-18  Insurance: Payor: Lizzy Mace / Plan: [address] [place] [address] HMO / Product Type: Managed Care Medicare /      In time: 11:47 Out time: 12:29   Total Treatment Time: 42     Medicare/BCBS [place] Time Tracking (below)   Total Timed Codes (min):  42 1:1 Treatment Time:  42     TREATMENT AREA =  Other low back pain [M54.59]    SUBJECTIVE    Pain Level (on 0 to 10 scale):  5  / 10   Medication Changes/New allergies or changes in medical history, any new surgeries or procedures? NO    If yes, update Summary List   Subjective Functional Status/Changes:  []  No changes reported     Pt states she just finished the 3rd injection for the knee gel and she goes back to the doctor in January. Pt reports walking down the 8 flights of stairs made her legs hurt more than the back. OBJECTIVE    20 min Therapeutic Exercise:  [x]  See flow sheet   Rationale:    increase ROM, increase strength, improve coordination, improve balance and increase proprioception to promote increased functional mobility and increased activity tolerance with ADL's. 10 min Neuromuscular Re-ed: Static standing balance exercises with EO/EC on compliant and non-compliant surfaces. Rationale:     improve coordination, improve balance and increase proprioception to improve the patients ability to perform ADLs, transfers and gait safely and independently. .    12 min Gait Training: Trial of gait with RW and rollator    Rationale:   improve coordination, improve balance, increase proprioception and improve gait pattern to increase safety and [place] with amb to promote increased functional mobility.         min Patient Education:  YES  Reviewed HEP   []  Progressed/Changed HEP based on:   Educated pt and her spouse regarding use of AD to facilitate improved gait mechanics and activity tolerance. Other Objective/Functional Measures:    Pt c/o knee pain stance leg with standing hip abduction and extension. Pt required short seated rest breaks between standing therapeutic exercise secondary to knee pain. Pt ambulates with slow gait speed, decreased stride length B. Trial of gait with RW and rollator. Pt demo's increased gait speed and step length ambulating with AD. Pt reports more confident with balance using RW and easier to negotiate/turn with rollator. Discussed pros/cons of each and how ambulating with AD improves balance secondary to proprioception and also decreased strain on LE's and L/S. Progressed static standing balance as able  EO MSR instep 30\" B   EC stance 30\"     Pt c/o left>right knee pain with sit<>stand. Added tap ups for SLS. Pt required intermittent CGA. Post Treatment Pain Level (on 0 to 10) scale:   5  / 10     ASSESSMENT    Assessment/Changes in Function:     Pt mayuri's improved gait mechanics ambulating with AD.      []  See Progress Note/Recertification   Patient will continue to benefit from skilled PT services to modify and progress therapeutic interventions, address functional mobility deficits, address ROM deficits, address strength deficits, analyze and address soft tissue restrictions, analyze and cue movement patterns, assess and modify postural abnormalities, and instruct in home and community integration to attain remaining goals. Progress toward goals / Updated goals:    1.  Patient will be independent with HEP to self-manage and prevent symptoms upon DC. 2.  Patient will improve FOTO score to at least 55 points to indicate improved functional status. trial of gait with AD for increased I and safety with gait   3.  Patient will demonstrate at least 4/5 hip abduction MMT B to improve pelvic stability with amb.    4.  Patient will perform a 5x sit to  no more than 13 seconds to decrease fall risk with amb. knee pain with sit<>stand     PLAN    []  Upgrade activities as tolerated YES Continue plan of care   []  Discharge due to :    []  Other:      Therapist: Bella Lubin PTA    Date: 12/16/2021 Time: 11:50 AM     Future Appointments   Date Time Provider Ying Koch   12/20/2021 11:00 AM Zulema Carmichael

## 2021-12-20 ENCOUNTER — HOSPITAL ENCOUNTER (OUTPATIENT)
Dept: PHYSICAL THERAPY | Age: 81
Discharge: HOME | End: 2021-12-20
Payer: MEDICARE

## 2021-12-20 PROCEDURE — 97110 THERAPEUTIC EXERCISES: CPT

## 2021-12-20 PROCEDURE — 97530 THERAPEUTIC ACTIVITIES: CPT

## 2021-12-20 PROCEDURE — 97112 NEUROMUSCULAR REEDUCATION: CPT

## 2021-12-20 NOTE — PROGRESS NOTES
PHYSICAL THERAPY - DAILY TREATMENT NOTE    Patient Name: Awilda Delgadillo        Date: 2021  : 1940   YES Patient  Verified  Visit #:     Insurance: Payor: Janae Franco / Plan: [address] [place] [address] HMO / Product Type: Managed Care Medicare /      In time: 11:00 Out time: 11:44   Total Treatment Time: 40     Medicare/BCBS [place] Time Tracking (below)   Total Timed Codes (min):  40 1:1 Treatment Time:  40     TREATMENT AREA =  Other low back pain [M54.59]    SUBJECTIVE    Pain Level (on 0 to 10 scale):  5  / 10   Medication Changes/New allergies or changes in medical history, any new surgeries or procedures? NO    If yes, update Summary List   Subjective Functional Status/Changes:  []  No changes reported     Pt reports her lower back was a 9/10 when she woke up this morning, she took her medication and now it's down to about a 5/10. Pt's spouse reports he tries to get her to walk, but she doesn't do much. Pt reports she can't find a chair at her house that is high enough to work on the sit<>stand exercise. OBJECTIVE    20 min Therapeutic Exercise:  [x]  See flow sheet   Rationale:    increase ROM, increase strength, improve coordination, improve balance and increase proprioception to promote increased functional mobility and increased activity tolerance with ADL's. 10 min Therapeutic Activity: sit<>stand   marching in place    Rationale:    increase ROM, increase strength, improve coordination, improve balance and increase proprioception to promote increased functional mobility and increased activity tolerance with ADL's. 10 min Neuromuscular Re-ed: Static standing balance exercises with EO/EC    Rationale:     improve coordination, improve balance and increase proprioception to improve the patients ability to perform ADLs, transfers and gait safely and independently.     4 min Patient Education:  YES  Reviewed HEP   []  Progressed/Changed HEP based on: Discussed with pt and pt's spouse importance of compliance with HEP to facilitate hip stabilization for increased tolerance to standing and walking activity. Advised pt and pt's spouse that an AD could promote increased safety and I with gait activity. Issued balance ex for pt to review at home. Reviewed safety for HEP with pt and spouse. Pt reports compliance with HEP with spouses A. Other Objective/Functional Measures:    Pt is still challenged with standing hip 3 way, min VCing for form. Pt c/o increased B knee and LBP with standing therapeutic activity . EO MSR instep 30\" B (lateral sway)   EC stance 30\"   Reviewed safety and foot placement for I with HEP. Pt required short seated rest break between standing therapeutic ex secondary to LBP. Pt requires 1-2 UE support for balance with marching in place. Post Treatment Pain Level (on 0 to 10) scale:   5  / 10     ASSESSMENT    Assessment/Changes in Function:     Pt demo's impaired balance strategy with therapeutic activity. []  See Progress Note/Recertification   Patient will continue to benefit from skilled PT services to modify and progress therapeutic interventions, address functional mobility deficits, address ROM deficits, address strength deficits, analyze and address soft tissue restrictions, analyze and cue movement patterns, assess and modify postural abnormalities, and instruct in home and community integration to attain remaining goals. Progress toward goals / Updated goals:    1.  Patient will be independent with HEP to self-manage and prevent symptoms upon DC. initiated balance ex for HEP  2.  Patient will improve FOTO score to at least 55 points to indicate improved functional status. 3.  Patient will demonstrate at least 4/5 hip abduction MMT B to improve pelvic stability with amb. 4.  Patient will perform a 5x sit to  no more than 13 seconds to decrease fall risk with amb.      PLAN    []  Upgrade activities as tolerated YES Continue plan of care   []  Discharge due to :    []  Other:      Therapist: Anju Castro PTA    Date: 12/20/2021 Time: 11:17 AM     No future appointments.

## 2022-01-03 ENCOUNTER — APPOINTMENT (OUTPATIENT)
Dept: PHYSICAL THERAPY | Age: 82
End: 2022-01-03
Payer: MEDICARE

## 2022-01-06 ENCOUNTER — HOSPITAL ENCOUNTER (OUTPATIENT)
Dept: PHYSICAL THERAPY | Age: 82
Discharge: HOME | End: 2022-01-06
Payer: MEDICARE

## 2022-01-06 PROCEDURE — 97110 THERAPEUTIC EXERCISES: CPT

## 2022-01-06 PROCEDURE — 97112 NEUROMUSCULAR REEDUCATION: CPT

## 2022-01-06 PROCEDURE — 97530 THERAPEUTIC ACTIVITIES: CPT

## 2022-01-06 NOTE — PROGRESS NOTES
PHYSICAL THERAPY - DAILY TREATMENT NOTE    Patient Name: Regina De Santiago        Date: 2022  : 1940   YES Patient  Verified  Visit #:     Insurance: Payor: Rosa Wilson / Plan: [address] [place] [address] HMO / Product Type: Managed Care Medicare /      In time: 11:02 Out time: 11:50   Total Treatment Time: 48     Medicare/BCBS [place] Time Tracking (below)   Total Timed Codes (min):  48 1:1 Treatment Time:  48     TREATMENT AREA =  Other low back pain [M54.59]    SUBJECTIVE    Pain Level (on 0 to 10 scale):  6  / 10   Medication Changes/New allergies or changes in medical history, any new surgeries or procedures? NO    If yes, update Summary List   Subjective Functional Status/Changes:  []  No changes reported     Pt reports she wasn't good about doing exercises over the holidays while visiting with family. Pt states her knees have really been sore recently, she did the series of 3 gel injections, but they didn't seem to help. Pt states she feels like she needs more therapy so she can get comfortable doing the exercises on her own. OBJECTIVE    18 min Therapeutic Exercise:  [x]  See flow sheet   Rationale:    increase ROM, increase strength, improve coordination, improve balance and increase proprioception to promote increased functional mobility and increased activity tolerance with ADL's. 20 min Therapeutic Activity: Five Time Sit to Stand Test  FOTO    Rationale:    increase ROM, increase strength, improve coordination, improve balance and increase proprioception to promote increased functional mobility and increased activity tolerance with ADL's. 10 min Neuromuscular Re-ed: Static standing balance exercises with EO/EC    Rationale:     improve coordination, improve balance and increase proprioception to improve the patients ability to perform ADLs, transfers and gait safely and independently.      min Patient Education:  YES  Reviewed HEP   []  Progressed/Changed HEP based on:   Discussed with pt updating HEP handout NV to work towards I, pt agreeable. Other Objective/Functional Measures:    Five Time Sit to Stand Test: 31\"   Pt c/o knee pain with sit<>stand and requires UE A from thighs. FOTO: 42/100     Post Treatment Pain Level (on 0 to 10) scale:   6  / 10     ASSESSMENT    Assessment/Changes in Function:     Pt's FOTO score decreased 1 point since last assessment indicating minimal change in activity tolerance. Pt's Five Time Sit to Stand Test indicates impaired mobility. [x]  See Progress Note/Recertification   Patient will continue to benefit from skilled PT services to modify and progress therapeutic interventions, address functional mobility deficits, address ROM deficits, address strength deficits, analyze and address soft tissue restrictions, analyze and cue movement patterns, assess and modify postural abnormalities, and instruct in home and community integration to attain remaining goals. Progress toward goals / Updated goals:    1.  Patient will be independent with HEP to self-manage and prevent symptoms upon DC. NOT MET   2.  Patient will improve FOTO score to at least 55 points to indicate improved functional status. NOT MET   3.  Patient will demonstrate at least 4/5 hip abduction MMT B to improve pelvic stability with amb. NOT MET   4.  Patient will perform a 5x sit to  no more than 13 seconds to decrease fall risk with amb.  NOT MET      PLAN    []  Upgrade activities as tolerated YES Continue plan of care   []  Discharge due to :    []  Other:      Therapist: Farnaz Harkins PTA    Date: 1/6/2022 Time: 11:08 AM     Future Appointments   Date Time Provider Ying Koch   1/10/2022 11:00 AM Janelle Diaz [facility]   1/13/2022 11:00 AM [id number]

## 2022-01-06 NOTE — PROGRESS NOTES
[facility] PHYSICAL THERAPY  [address] [facility] [address], [address] - Phone: [phone]  Fax: [phone] OF CARE FOR PHYSICAL THERAPY          Patient Name: Catracho Jameson : 1940   Treatment/Medical Diagnosis: Other low back pain [M54.59]   Onset Date: Chronic 2020    Referral Source: Karina Madrid MD [facility]): 10/21/21   Prior Hospitalization: See Medical History Provider #: [id number]   Prior Level of Function: Requires supervision assist with amb and standing ADL's due to fear of falling and partial blindness   Comorbidities: Right eye partial blindness-glaucoma, bilateral knee OA   Medications: Verified on Patient Summary List   Visits from [facility]: 12 Missed Visits: 2     Goal/Measure of Progress Goal Met?   1.  1.  Patient will be independent with HEP to self-manage and prevent symptoms upon DC. Status at last Eval: partial compliance with HEP Current Status: progressing toward I no   2.  2.  Patient will improve FOTO score to at least 55 points to indicate improved functional status. Status at last Eval: 43/100 Current Status: 42/100 no   3.  3.  Patient will demonstrate at least 4/5 hip abduction MMT B to improve pelvic stability with amb. Status at last Eval: 3-/5 Current Status: 3+/5 no   4.  4.  Patient will perform a 5x sit to  no more than 13 seconds to decrease fall risk with amb. Status at last Eval: NA Current Status: 31 seconds  no     Key Functional Changes/Progress: Pt progressing slowly with Physical Therapy and reporting minimal change in activity tolerance. Pt's score on the Functional Status Summary scale indicates pt has moderate difficulty performing usual household activities and correlates to a 58% functional limitation. Pt continues with difficulty with sit<>stand secondary to LE weakness and B knee pain. Pt's score on the Five Time Sit to Stand Test indicates impaired mobility.  Pt has had inconsistent compliance with home exercise program, but is able to participate in therapeutic activity without increasing her pain/symptoms. Problem List: pain affecting function, decrease ROM, decrease strength, impaired gait/ balance, decrease ADL/ functional abilitiies, decrease activity tolerance, decrease flexibility/ joint mobility and decrease transfer abilities              Treatment Plan may include any combination of the following: Therapeutic exercise, Therapeutic activities, Neuromuscular re-education, Physical agent/modality, Gait/balance training, Manual therapy, Patient education, Self Care training, Functional mobility training, Home safety training and Stair training    Goals for this certification period include and are to be achieved in   2-4  weeks:  1. Patient will be independent with HEP to self-manage and prevent symptoms upon DC. 2.  Patient will improve FOTO score to at least 55 points to indicate improved functional status. 3.  Patient will demonstrate at least 4/5 hip abduction MMT B to improve pelvic stability with amb. 4.  Patient will perform a 5x sit to  no more than 13 seconds to decrease fall risk with amb. Frequency / Duration:   Patient to be seen   1-2   times per week for   2-4    weeks:    Assessments/Recommendations: Pt would benefit from continued therapy to address LE strength deficits, impaired mobility and facilitate I with home exercise program.     If you have any questions/comments please contact us directly at [phone]. Thank you for allowing us to assist in the care of your patient. PTA Signature  Therapist Signature: OLGA Russell DPT, CLT Date: 2/5/0114   Certification Period:  Reporting Period: 01/06/2022-02/05/2022 11/22/2022-01/06/2022 Time: 1:49 PM   NOTE TO PHYSICIAN:  PLEASE COMPLETE THE ORDERS BELOW AND FAX TO   [facility]: [phone].   If you are unable to process this request in 54 hours please contact our office: [phone].    ___ I have read the above report and request that my patient continue as recommended.   ___ I have read the above report and request that my patient continue therapy with the following changes/special instructions: ________________________________________________   ___ I have read the above report and request that my patient be discharged from therapy.      Physician Signature:        Date:       Time:                                   Kristina Beach MD

## 2022-01-10 ENCOUNTER — APPOINTMENT (OUTPATIENT)
Dept: PHYSICAL THERAPY | Age: 82
End: 2022-01-10
Payer: MEDICARE

## 2022-01-13 ENCOUNTER — HOSPITAL ENCOUNTER (OUTPATIENT)
Dept: PHYSICAL THERAPY | Age: 82
Discharge: HOME | End: 2022-01-13
Payer: MEDICARE

## 2022-01-13 PROCEDURE — 97530 THERAPEUTIC ACTIVITIES: CPT

## 2022-01-13 PROCEDURE — 97110 THERAPEUTIC EXERCISES: CPT

## 2022-01-13 PROCEDURE — 97112 NEUROMUSCULAR REEDUCATION: CPT

## 2022-01-13 NOTE — PROGRESS NOTES
PHYSICAL THERAPY - DAILY TREATMENT NOTE    Patient Name: Denise Fuchs        Date: 2022  : 1940   YES Patient  Verified  Visit #:   15   of   12-18  Insurance: Payor: Osmel Torres / Plan: [address] [place] [address] HMO / Product Type: Managed Care Medicare /      In time: 11:00 Out time: 11:47   Total Treatment Time: 47     Medicare/BCBS [place] Time Tracking (below)   Total Timed Codes (min):  47 1:1 Treatment Time:  47     TREATMENT AREA =  Other low back pain [M54.59]    SUBJECTIVE    Pain Level (on 0 to 10 scale):  4-5   10   Medication Changes/New allergies or changes in medical history, any new surgeries or procedures? NO    If yes, update Summary List   Subjective Functional Status/Changes:  []  No changes reported     Pt reports she feels nervous doing the balance exercises at home, feels like she is swaying and going to fall. Pt states she went and saw Dr. Alexis Stark and they said they have tried pretty much everything for her knee pain and it hasn't worked, so the only option left is knee replacement surgery and she doesn't know if she would want to do that. OBJECTIVE    29 min Therapeutic Exercise:  [x]  See flow sheet   Rationale:    increase ROM, increase strength, improve coordination, improve balance and increase proprioception to promote increased functional mobility and increased activity tolerance with ADL's. 10 min Therapeutic Activity: dynamic gait activity with and without AD for balance   Rationale:    increase ROM, increase strength, improve coordination, improve balance and increase proprioception to promote increased functional mobility and increased activity tolerance with ADL's.    8 min Neuromuscular Re-ed: Static standing balance exercises with EO/EC   Rationale:     improve coordination, improve balance and increase proprioception to improve the patients ability to perform ADLs, transfers and gait safely and independently.      min Patient Education:  Raffaele Guillen Reviewed HEP   []  Progressed/Changed HEP based on:   Provided pt updated HEP to review at home to promote I. Other Objective/Functional Measures:    Reviewed static standing balance  EO ROM 30\"   EO MSR instep 30\" B   EC narrow stance 30\"   Minimal sway noted with static standing balance ex. Advised pt to find to continue to trial at home, maybe in different place to make her feel more comfortable. Had pt perform heel to toe walking and gait with head turns without an AD and with rollator to assess balance. Pt c/o left>right knee pain with walking and standing therapeutic activity. Pt's spouse provides A for obstacle negotiation during gait by holding patients hand. Post Treatment Pain Level (on 0 to 10) scale:   4-5  / 10     ASSESSMENT    Assessment/Changes in Function:     Pt demo's ability to ambulate with good balance with rollator without A.      []  See Progress Note/Recertification   Patient will continue to benefit from skilled PT services to modify and progress therapeutic interventions, address functional mobility deficits, address ROM deficits, address strength deficits, analyze and address soft tissue restrictions, analyze and cue movement patterns, assess and modify postural abnormalities, and instruct in home and community integration to attain remaining goals. Progress toward goals / Updated goals:    1.  Patient will be independent with HEP to self-manage and prevent symptoms upon DC. 2.  Patient will improve FOTO score to at least 55 points to indicate improved functional status. practiced gait with AD to promote increased I   3.  Patient will demonstrate at least 4/5 hip abduction MMT B to improve pelvic stability with amb. 4.  Patient will perform a 5x sit to  no more than 13 seconds to decrease fall risk with amb.      PLAN    []  Upgrade activities as tolerated YES Continue plan of care   []  Discharge due to :    []  Other:      Therapist: Verónica Silva OLGA Hdz    Date: 1/13/2022 Time: 11:28 AM     Future Appointments   Date Time Provider Ying Koch   1/17/2022 11:00 AM [facility]   1/19/2022 11:00 AM [facility]   1/25/2022 11:45 AM [facility]   1/27/2022 11:00 AM [facility]   1/31/2022 11:00 AM [facility]

## 2022-01-25 ENCOUNTER — HOSPITAL ENCOUNTER (OUTPATIENT)
Dept: PHYSICAL THERAPY | Age: 82
Discharge: HOME | End: 2022-01-25
Payer: MEDICARE

## 2022-01-25 PROCEDURE — 97112 NEUROMUSCULAR REEDUCATION: CPT

## 2022-01-25 PROCEDURE — 97110 THERAPEUTIC EXERCISES: CPT

## 2022-01-25 NOTE — PROGRESS NOTES
PHYSICAL THERAPY - DAILY TREATMENT NOTE    Patient Name: Angel Luis Painter        Date: 2022  : 1940   YES Patient  Verified  Visit #:     Insurance: Payor: Uriel Campos / Plan: [address] [place] [address] HMO / Product Type: Managed Care Medicare /      In time: 11:48 Out time: 12:31   Total Treatment Time: 43     Medicare/BCBS [place] Time Tracking (below)   Total Timed Codes (min):  43 1:1 Treatment Time:  43     TREATMENT AREA =  Other low back pain [M54.59]    SUBJECTIVE    Pain Level (on 0 to 10 scale):  5  / 10   Medication Changes/New allergies or changes in medical history, any new surgeries or procedures? NO    If yes, update Summary List   Subjective Functional Status/Changes:  []  No changes reported     Pt reports she has been feeling more tired recently. Pt's spouse reports she does spend a lot of time laying in bed, pt states she lays down because her of her back and knee pain. Pt states her vision definitely limits her walking and ability to do daily tasks like cooking, reports her  does the cooking. OBJECTIVE    33 min Therapeutic Exercise:  [x]  See flow sheet   Rationale:    increase ROM, increase strength, improve coordination, improve balance and increase proprioception to promote increased functional mobility and increased activity tolerance with ADL's. 10 min Neuromuscular Re-ed: Static standing balance exercises with EO/EC    Rationale:     improve coordination, improve balance and increase proprioception to improve the patients ability to perform ADLs, transfers and gait safely and independently. min Patient Education:  YES  Reviewed HEP   []  Progressed/Changed HEP based on:   Pt's  reports he has tried doing the exercises with her to get her to be more compliant, they have done them a few times.       Other Objective/Functional Measures:    Discussed with pt working on slowly increasing activity with ADL's to include walking in long hallway of [facility] for exercise with RW.      BP taken after warm-up on [place]=791/84 mmHg. Pt still fearful with static standing balance ex. Had pt practice standing in front of plinth with RW in front of her. Pt states feels like she is moving a lot when she closes her eyes. [place] for form with therapeutic exercise. EO ROM 30\"   EC narrow stance 30\"   Unable to progress pt's static standing balance ex secondary to fearful of falling. Post Treatment Pain Level (on 0 to 10) scale:   5  / 10     ASSESSMENT    Assessment/Changes in Function:     Pt reports increased fatigue recently for no known reason. []  See Progress Note/Recertification   Patient will continue to benefit from skilled PT services to modify and progress therapeutic interventions, address functional mobility deficits, address ROM deficits, address strength deficits, analyze and address soft tissue restrictions, analyze and cue movement patterns, assess and modify postural abnormalities, and instruct in home and community integration to attain remaining goals. Progress toward goals / Updated goals:    1.  Patient will be independent with HEP to self-manage and prevent symptoms upon DC. [place] for form with therapeutic exercise  2.  Patient will improve FOTO score to at least 55 points to indicate improved functional status. 3.  Patient will demonstrate at least 4/5 hip abduction MMT B to improve pelvic stability with amb. 4.  Patient will perform a 5x sit to  no more than 13 seconds to decrease fall risk with amb.      PLAN    []  Upgrade activities as tolerated YES Continue plan of care   []  Discharge due to :    []  Other:      Therapist: Mingo Soriano PTA    Date: 1/25/2022 Time: 3:26 PM     Future Appointments   Date Time Provider Ying Koch   1/27/2022 11:00 AM Farida [facility]   1/31/2022 11:00 AM Morgan Allred [facility]

## 2022-01-27 ENCOUNTER — HOSPITAL ENCOUNTER (OUTPATIENT)
Dept: PHYSICAL THERAPY | Age: 82
Discharge: HOME | End: 2022-01-27
Payer: MEDICARE

## 2022-01-27 PROCEDURE — 97110 THERAPEUTIC EXERCISES: CPT

## 2022-01-27 PROCEDURE — 97530 THERAPEUTIC ACTIVITIES: CPT

## 2022-01-27 NOTE — PROGRESS NOTES
PHYSICAL THERAPY - DAILY TREATMENT NOTE    Patient Name: Bakari Montemayor        Date: 2022  : 1940   YES Patient  Verified  Visit #:   15   of   12-18  Insurance: Payor: Jeremy Ceballos / Plan: [address] [place] [address] HMO / Product Type: Managed Care Medicare /      In time: 11:05 Out time: 11:43   Total Treatment Time: 38     Medicare/BCBS [place] Time Tracking (below)   Total Timed Codes (min):  38 1:1 Treatment Time:  38     TREATMENT AREA =  Other low back pain [M54.59]    SUBJECTIVE    Pain Level (on 0 to 10 scale):   10   Medication Changes/New allergies or changes in medical history, any new surgeries or procedures? NO    If yes, update Summary List   Subjective Functional Status/Changes:  []  No changes reported     Pt reports she feels more comfortable doing the balance exercises standing in front of her bed instead of standing in a corner. OBJECTIVE    26 min Therapeutic Exercise:  [x]  See flow sheet   Rationale:    increase ROM, increase strength, improve coordination, improve balance and increase proprioception to promote increased functional mobility and increased activity tolerance with ADL's.    12 min Therapeutic Activity: FOTO   sit<>stand    Rationale:    increase ROM, increase strength, improve coordination, improve balance and increase proprioception to promote increased functional mobility and increased activity tolerance with ADL's.      min Patient Education:  YES  Reviewed HEP   []  Progressed/Changed HEP based on:   Discussed with pt continuing balance exercises daily and strengthening exercises 4 days a week. Also encouraged pt to walk in hallway with RW for endurance.       Other Objective/Functional Measures:      Strength (MMT)          Right        Left    Quads (L3) 4 4   Ant tib(L4) 5 5   Hip Ext (S1, S2) 3 3   Glut Med(L5) 3+ 3+     FOTO 52/100    Five Time Sit to Stand Test: 15\"        Post Treatment Pain Level (on 0 to 10) scale:   4  / 10 ASSESSMENT    Assessment/Changes in Function:     Pt's Five Time sit to stand time decreased indicating improved functional mobility. [x]  See Progress Note/Recertification   Patient will continue to benefit from skilled PT services to  NA-See DC Summary     Progress toward goals / Updated goals:    1.  Patient will be independent with HEP to self-manage and prevent symptoms upon DC. MET  2.  Patient will improve FOTO score to at least 55 points to indicate improved functional status. NOT MET   3.  Patient will demonstrate at least 4/5 hip abduction MMT B to improve pelvic stability with amb. NOT MET   4.  Patient will perform a 5x sit to  no more than 13 seconds to decrease fall risk with amb.  NOT MET      PLAN    []  Upgrade activities as tolerated NA Continue plan of care   [x]  Discharge due to : program completed    []  Other:      Therapist: Xavi Subramanian PTA    Date: 1/27/2022 Time: 11:27 AM     Future Appointments   Date Time Provider Ying Koch   1/31/2022 11:00 AM Vitaly Carrillo

## 2022-01-28 NOTE — PROGRESS NOTES
[facility] PHYSICAL THERAPY  [address] [place] [address] [address], [facility], [address] - Phone: [phone]  Fax: [phone]  DISCHARGE SUMMARY FOR PHYSICAL THERAPY          Patient Name: Kari Rangel : 1940   Treatment/Medical Diagnosis: Other low back pain [M54.59]   Onset Date: Chronic 2020      Referral Source: Keerthi Hall MD [place] [facility]): 10/21/21   Prior Hospitalization: See Medical History Provider #: [id number]   Prior Level of Function: Requires supervision assist with amb and standing ADL's due to fear of falling and partial blindness   Comorbidities: Right eye partial blindness-glaucoma, bilateral knee OA       Medications: Verified on Patient Summary List   Visits from [facility]: 15 Missed Visits: 4     Goal/Measure of Progress Goal Met?   1.  1.  Patient will be independent with HEP to self-manage and prevent symptoms upon DC   Status at last Eval: partial compliance with HEP Current Status: I with HEP yes   2.  2.  Patient will improve FOTO score to at least 55 points to indicate improved functional status   Status at last Eval: 42/100 Current Status: 52/100 Progresing   3.  3.  Patient will demonstrate at least 4/5 hip abduction MMT B to improve pelvic stability with amb. Status at last Eval: 3+/5 Current Status: 3+/5 no   4.  4.  Patient will perform a 5x sit to  no more than 13 seconds to decrease fall risk with amb. Status at last Eval: 31 seconds  Current Status: 15 seconds no     Key Functional Changes/Progress: Pt progressed slowly with therapy. Pt continues with c/o average 4-5/10 B knee pain with ADL's and therapeutic activity. Pt with good tolerance to therapeutic exercise, but reports minimal change in activity tolerance with ADL's. Pt's Functional Status Summary score improved 10 points indicating pt exhibits moderate difficulty performing usual household activities.  Pt has been encouraged to increase frequency of ambulation with rolling walker/rollator to facilitate LE strength, endurance and promote increased I/safety with gait. Assessments/Recommendations: Discontinue therapy. Progressing towards or have reached established goals. If you have any questions/comments please contact us directly at [phone]. Thank you for allowing us to assist in the care of your patient. PTA Signature: Therapist Signature  Richelle Moya   Date: 01/28/2022   Reporting Period: 01/06/2022-01/27/2022 Time: 9:05 AM     NOTE TO PHYSICIAN:  PLEASE COMPLETE THE ORDERS BELOW AND FAX TO   [facility]: [phone]. If you are unable to process this request in 24 hours please contact our office: [phone].    ___ I have read the above report and request that my patient be discharged from therapy.      Physician Signature:        Date:______Time:                                            Eddi Saldana MD

## 2022-01-31 ENCOUNTER — APPOINTMENT (OUTPATIENT)
Dept: PHYSICAL THERAPY | Age: 82
End: 2022-01-31
Payer: MEDICARE